# Patient Record
Sex: FEMALE | Race: WHITE | NOT HISPANIC OR LATINO | Employment: OTHER | URBAN - METROPOLITAN AREA
[De-identification: names, ages, dates, MRNs, and addresses within clinical notes are randomized per-mention and may not be internally consistent; named-entity substitution may affect disease eponyms.]

---

## 2017-08-30 ENCOUNTER — TELEPHONE (OUTPATIENT)
Dept: OTOLARYNGOLOGY | Facility: CLINIC | Age: 57
End: 2017-08-30

## 2017-08-31 ENCOUNTER — OFFICE VISIT (OUTPATIENT)
Dept: OTOLARYNGOLOGY | Facility: CLINIC | Age: 57
End: 2017-08-31
Payer: COMMERCIAL

## 2017-08-31 VITALS — DIASTOLIC BLOOD PRESSURE: 73 MMHG | SYSTOLIC BLOOD PRESSURE: 128 MMHG | HEART RATE: 66 BPM | TEMPERATURE: 98 F

## 2017-08-31 DIAGNOSIS — H92.01 EAR DISCOMFORT, RIGHT: ICD-10-CM

## 2017-08-31 DIAGNOSIS — H61.21 IMPACTED CERUMEN, RIGHT EAR: Primary | ICD-10-CM

## 2017-08-31 PROCEDURE — 99999 PR PBB SHADOW E&M-EST. PATIENT-LVL III: CPT | Mod: PBBFAC,,, | Performed by: OTOLARYNGOLOGY

## 2017-08-31 PROCEDURE — 99499 UNLISTED E&M SERVICE: CPT | Mod: S$GLB,,, | Performed by: OTOLARYNGOLOGY

## 2017-08-31 PROCEDURE — 69210 REMOVE IMPACTED EAR WAX UNI: CPT | Mod: S$GLB,,, | Performed by: OTOLARYNGOLOGY

## 2017-08-31 NOTE — PROGRESS NOTES
CC:Ear Cleaning  HPI:Ms. Potts is a 57 year old CF who indicates the need for an ear cleaning procedure.  She can tell when her ears become clogged, especially her right ear  I removed the cerumen impaction from her right ear in late July 2016.  I made note of a hyperpigmented skin lesion as a.  Left pinna at that time suggested dermatology evaluation.  The patient's medical problem list includes palpitations and a heart murmur.      ALLERGIES: Benadryl, shellfish  PE:/73 P 66 T 97.9 height 5 feet 4 inches weight 134 pounds  Gen.:  Alert and oriented lady in no acute distress  Both ears were examined under the microscope in the micro-procedure room.  A dry cerumen impaction with some dry skin is is extracted from the occluded right ear canal with microforceps.The right eardrum is intact and clear.  Several tiny superficial sebaceous cysts are noted to the left of the entrance of the right ear canal.  They are not inflamed.  Left ear canal is devoid of wax.  Left eardrum is intact and clear as visualized.      DIAGNOSIS:     ICD-10-CM ICD-9-CM    1. Impacted cerumen, right ear H61.21 380.4    2. Ear discomfort, right H92.01 388.70      PLAN: Dry skin/cerumen impaction removed from AD eac  AS eac devoid of wax  RTC prn

## 2022-11-22 ENCOUNTER — TELEPHONE (OUTPATIENT)
Dept: OTOLARYNGOLOGY | Facility: CLINIC | Age: 62
End: 2022-11-22
Payer: COMMERCIAL

## 2022-12-01 ENCOUNTER — OFFICE VISIT (OUTPATIENT)
Dept: OTOLARYNGOLOGY | Facility: CLINIC | Age: 62
End: 2022-12-01
Payer: COMMERCIAL

## 2022-12-01 VITALS — HEART RATE: 74 BPM | DIASTOLIC BLOOD PRESSURE: 84 MMHG | SYSTOLIC BLOOD PRESSURE: 146 MMHG

## 2022-12-01 DIAGNOSIS — H61.891 DRY EAR CANAL, RIGHT: ICD-10-CM

## 2022-12-01 DIAGNOSIS — Z78.9 HISTORY OF EXCESSIVE CERUMEN: Primary | ICD-10-CM

## 2022-12-01 DIAGNOSIS — H61.21 HEARING LOSS DUE TO CERUMEN IMPACTION, RIGHT: ICD-10-CM

## 2022-12-01 PROCEDURE — 3079F PR MOST RECENT DIASTOLIC BLOOD PRESSURE 80-89 MM HG: ICD-10-PCS | Mod: CPTII,S$GLB,, | Performed by: OTOLARYNGOLOGY

## 2022-12-01 PROCEDURE — 1159F MED LIST DOCD IN RCRD: CPT | Mod: CPTII,S$GLB,, | Performed by: OTOLARYNGOLOGY

## 2022-12-01 PROCEDURE — 99499 UNLISTED E&M SERVICE: CPT | Mod: S$GLB,,, | Performed by: OTOLARYNGOLOGY

## 2022-12-01 PROCEDURE — 3077F SYST BP >= 140 MM HG: CPT | Mod: CPTII,S$GLB,, | Performed by: OTOLARYNGOLOGY

## 2022-12-01 PROCEDURE — 99999 PR PBB SHADOW E&M-EST. PATIENT-LVL III: CPT | Mod: PBBFAC,,, | Performed by: OTOLARYNGOLOGY

## 2022-12-01 PROCEDURE — 1159F PR MEDICATION LIST DOCUMENTED IN MEDICAL RECORD: ICD-10-PCS | Mod: CPTII,S$GLB,, | Performed by: OTOLARYNGOLOGY

## 2022-12-01 PROCEDURE — 3077F PR MOST RECENT SYSTOLIC BLOOD PRESSURE >= 140 MM HG: ICD-10-PCS | Mod: CPTII,S$GLB,, | Performed by: OTOLARYNGOLOGY

## 2022-12-01 PROCEDURE — 99999 PR PBB SHADOW E&M-EST. PATIENT-LVL III: ICD-10-PCS | Mod: PBBFAC,,, | Performed by: OTOLARYNGOLOGY

## 2022-12-01 PROCEDURE — 99499 NO LOS: ICD-10-PCS | Mod: S$GLB,,, | Performed by: OTOLARYNGOLOGY

## 2022-12-01 PROCEDURE — 3079F DIAST BP 80-89 MM HG: CPT | Mod: CPTII,S$GLB,, | Performed by: OTOLARYNGOLOGY

## 2022-12-01 NOTE — PROGRESS NOTES
CC:  HPI: Ms. Tsai is a 62 year old CF with a history of palpitations/heart murmur who resides in [facility]. 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  She presents today for extraction of a cerumen impaction affecting her right ear canal specifically. There is a plugged sensation in her right ear.  I  disimpacted her right ear ( C.I. extraction) here in late August 2017. I had noted several small non-obstructive sebaceous cysts at the posterior rim of her right ear canal.  She has had her ears cleaned elsewhere in the meantime. She is reticent to place anything in her ear canals in general.  She has infrequent trouble with her ears on airplanes.      No past medical history on file.  ALL:  Benadryl, shellfish  Answers submitted by the patient for this visit:  Review of Symptoms Questionnaire  (Submitted on 11/29/2022)  None of these: Yes  None of these: Yes  None of these : Yes  None of these: Yes  Irregular heartbeat?: Yes  None of these: Yes  None of these: Yes  None of these: Yes  None of these : Yes  Seasonal Allergies?: Yes  None of these : Yes  None of these: Yes  None of these: Yes  None of these: Yes    ALL: benadryl/shellfish  PE:Gen.: alert and oriented bespectacled CF in no acute distress.  Both ears are examined under the microscope.  Procedures: A large dry plug of cerumen ( with a layer of dry skin) is extracted from the occluded dry AD eac with a blunt curette and microforceps.  A very small amount of cerumen is removed from the non-occluded AS eac.  Both TMs are clear and intact as visualized.  Again, I make note of several small sebaceous cysts noted at the entrance of the right ear canal postero-superiorly without obvious infection or abscess.    DIAGNOSIS:     ICD-10-CM ICD-9-CM    1. History of excessive cerumen  Z78.9 V49.89       2. Hearing loss due to cerumen impaction, right  H61.21 389.8      380.4       3. Dry ear canal, right  H61.891 380.89         PLAN: Dry C.I.  ( with some dry skin) extracted from dry ear canal   Small amount of cerumen removed from left ear canal with curette  RTC yearly for ear cleaning

## 2022-12-01 NOTE — PATIENT INSTRUCTIONS
Dry C.I. ( with some dry skin) extracted from dry ear canal   Small amount of cerumen removed from left ear canal with curette  RTC yearly for ear cleaning

## 2024-08-04 ENCOUNTER — HOSPITAL ENCOUNTER (OUTPATIENT)
Facility: HOSPITAL | Age: 64
Discharge: HOME | End: 2024-08-04
Attending: EMERGENCY MEDICINE | Admitting: INTERNAL MEDICINE
Payer: COMMERCIAL

## 2024-08-04 VITALS
BODY MASS INDEX: 22.36 KG/M2 | DIASTOLIC BLOOD PRESSURE: 49 MMHG | SYSTOLIC BLOOD PRESSURE: 112 MMHG | OXYGEN SATURATION: 97 % | TEMPERATURE: 98 F | HEART RATE: 87 BPM | HEIGHT: 64 IN | WEIGHT: 131 LBS | RESPIRATION RATE: 18 BRPM

## 2024-08-04 DIAGNOSIS — I48.91 NEW ONSET A-FIB: Primary | ICD-10-CM

## 2024-08-04 DIAGNOSIS — I48.91 NEW ONSET A-FIB: ICD-10-CM

## 2024-08-04 DIAGNOSIS — I48.91 ATRIAL FIBRILLATION WITH RVR: Primary | ICD-10-CM

## 2024-08-04 DIAGNOSIS — R00.2 PALPITATIONS: ICD-10-CM

## 2024-08-04 DIAGNOSIS — R07.9 CHEST PAIN: ICD-10-CM

## 2024-08-04 PROBLEM — Z78.9 ALCOHOL USE: Status: ACTIVE | Noted: 2024-08-04

## 2024-08-04 PROBLEM — E87.6 HYPOKALEMIA: Status: ACTIVE | Noted: 2024-08-04

## 2024-08-04 LAB
ALBUMIN SERPL BCP-MCNC: 4 G/DL (ref 3.5–5.2)
ALP SERPL-CCNC: 73 U/L (ref 55–135)
ALT SERPL W/O P-5'-P-CCNC: 13 U/L (ref 10–44)
AMPHET+METHAMPHET UR QL: NEGATIVE
ANION GAP SERPL CALC-SCNC: 11 MMOL/L (ref 8–16)
ASCENDING AORTA: 3.09 CM
AST SERPL-CCNC: 17 U/L (ref 10–40)
AV INDEX (PROSTH): 0.57
AV MEAN GRADIENT: 3 MMHG
AV PEAK GRADIENT: 5 MMHG
AV VALVE AREA BY VELOCITY RATIO: 1.98 CM²
AV VALVE AREA: 1.74 CM²
AV VELOCITY RATIO: 0.64
BARBITURATES UR QL SCN>200 NG/ML: NEGATIVE
BASOPHILS # BLD AUTO: 0.03 K/UL (ref 0–0.2)
BASOPHILS NFR BLD: 0.4 % (ref 0–1.9)
BENZODIAZ UR QL SCN>200 NG/ML: NEGATIVE
BILIRUB SERPL-MCNC: 0.3 MG/DL (ref 0.1–1)
BNP SERPL-MCNC: 38 PG/ML (ref 0–99)
BSA FOR ECHO PROCEDURE: 1.64 M2
BUN SERPL-MCNC: 17 MG/DL (ref 8–23)
BZE UR QL SCN: NEGATIVE
CALCIUM SERPL-MCNC: 9.6 MG/DL (ref 8.7–10.5)
CANNABINOIDS UR QL SCN: NEGATIVE
CHLORIDE SERPL-SCNC: 107 MMOL/L (ref 95–110)
CO2 SERPL-SCNC: 24 MMOL/L (ref 23–29)
CREAT SERPL-MCNC: 0.9 MG/DL (ref 0.5–1.4)
CREAT UR-MCNC: 22 MG/DL (ref 15–325)
CV ECHO LV RWT: 0.38 CM
D DIMER PPP IA.FEU-MCNC: 0.63 MG/L FEU
DIFFERENTIAL METHOD BLD: NORMAL
DOP CALC AO PEAK VEL: 1.15 M/S
DOP CALC AO VTI: 18.72 CM
DOP CALC LVOT AREA: 3.1 CM2
DOP CALC LVOT DIAMETER: 1.98 CM
DOP CALC LVOT PEAK VEL: 0.74 M/S
DOP CALC LVOT STROKE VOLUME: 32.65 CM3
DOP CALCLVOT PEAK VEL VTI: 10.61 CM
E/E' RATIO: 13.5 M/S
ECHO LV POSTERIOR WALL: 0.74 CM (ref 0.6–1.1)
EOSINOPHIL # BLD AUTO: 0.2 K/UL (ref 0–0.5)
EOSINOPHIL NFR BLD: 2.8 % (ref 0–8)
ERYTHROCYTE [DISTWIDTH] IN BLOOD BY AUTOMATED COUNT: 13.7 % (ref 11.5–14.5)
EST. GFR  (NO RACE VARIABLE): >60 ML/MIN/1.73 M^2
ETHANOL UR-MCNC: <10 MG/DL
FRACTIONAL SHORTENING: 34 % (ref 28–44)
GLUCOSE SERPL-MCNC: 104 MG/DL (ref 70–110)
HCT VFR BLD AUTO: 42.1 % (ref 37–48.5)
HCV AB SERPL QL IA: NORMAL
HGB BLD-MCNC: 13.7 G/DL (ref 12–16)
HIV 1+2 AB+HIV1 P24 AG SERPL QL IA: NORMAL
IMM GRANULOCYTES # BLD AUTO: 0.02 K/UL (ref 0–0.04)
IMM GRANULOCYTES NFR BLD AUTO: 0.3 % (ref 0–0.5)
INTERVENTRICULAR SEPTUM: 0.66 CM (ref 0.6–1.1)
IVRT: 89.44 MSEC
LA MAJOR: 5.85 CM
LA MINOR: 4.91 CM
LA WIDTH: 3.9 CM
LEFT ATRIUM SIZE: 3.61 CM
LEFT ATRIUM VOLUME INDEX MOD: 29.4 ML/M2
LEFT ATRIUM VOLUME INDEX: 39.2 ML/M2
LEFT ATRIUM VOLUME MOD: 47.94 CM3
LEFT ATRIUM VOLUME: 63.89 CM3
LEFT INTERNAL DIMENSION IN SYSTOLE: 2.57 CM (ref 2.1–4)
LEFT VENTRICLE DIASTOLIC VOLUME INDEX: 40.04 ML/M2
LEFT VENTRICLE DIASTOLIC VOLUME: 65.26 ML
LEFT VENTRICLE MASS INDEX: 46 G/M2
LEFT VENTRICLE SYSTOLIC VOLUME INDEX: 14.6 ML/M2
LEFT VENTRICLE SYSTOLIC VOLUME: 23.85 ML
LEFT VENTRICULAR INTERNAL DIMENSION IN DIASTOLE: 3.88 CM (ref 3.5–6)
LEFT VENTRICULAR MASS: 74.47 G
LV LATERAL E/E' RATIO: 16.2 M/S
LV SEPTAL E/E' RATIO: 11.57 M/S
LYMPHOCYTES # BLD AUTO: 3.1 K/UL (ref 1–4.8)
LYMPHOCYTES NFR BLD: 41.2 % (ref 18–48)
MAGNESIUM SERPL-MCNC: 2.2 MG/DL (ref 1.6–2.6)
MCH RBC QN AUTO: 29.3 PG (ref 27–31)
MCHC RBC AUTO-ENTMCNC: 32.5 G/DL (ref 32–36)
MCV RBC AUTO: 90 FL (ref 82–98)
METHADONE UR QL SCN>300 NG/ML: NEGATIVE
MONOCYTES # BLD AUTO: 0.8 K/UL (ref 0.3–1)
MONOCYTES NFR BLD: 10.9 % (ref 4–15)
MV PEAK E VEL: 0.81 M/S
NEUTROPHILS # BLD AUTO: 3.3 K/UL (ref 1.8–7.7)
NEUTROPHILS NFR BLD: 44.4 % (ref 38–73)
NRBC BLD-RTO: 0 /100 WBC
OHS QRS DURATION: 72 MS
OHS QRS DURATION: 76 MS
OHS QTC CALCULATION: 463 MS
OHS QTC CALCULATION: 482 MS
OPIATES UR QL SCN: NEGATIVE
PCP UR QL SCN>25 NG/ML: NEGATIVE
PISA TR MAX VEL: 2.71 M/S
PLATELET # BLD AUTO: 253 K/UL (ref 150–450)
PMV BLD AUTO: 10.3 FL (ref 9.2–12.9)
POTASSIUM SERPL-SCNC: 3.3 MMOL/L (ref 3.5–5.1)
PROT SERPL-MCNC: 7.1 G/DL (ref 6–8.4)
RA MAJOR: 5.24 CM
RA PRESSURE ESTIMATED: 8 MMHG
RA WIDTH: 3.21 CM
RBC # BLD AUTO: 4.68 M/UL (ref 4–5.4)
RV TB RVSP: 11 MMHG
SINUS: 2.9 CM
SODIUM SERPL-SCNC: 142 MMOL/L (ref 136–145)
STJ: 2.42 CM
TDI LATERAL: 0.05 M/S
TDI SEPTAL: 0.07 M/S
TDI: 0.06 M/S
TOXICOLOGY INFORMATION: NORMAL
TR MAX PG: 29 MMHG
TRICUSPID ANNULAR PLANE SYSTOLIC EXCURSION: 1.99 CM
TROPONIN I SERPL DL<=0.01 NG/ML-MCNC: <0.006 NG/ML (ref 0–0.03)
TSH SERPL DL<=0.005 MIU/L-ACNC: 3.21 UIU/ML (ref 0.4–4)
TV REST PULMONARY ARTERY PRESSURE: 37 MMHG
WBC # BLD AUTO: 7.5 K/UL (ref 3.9–12.7)
Z-SCORE OF LEFT VENTRICULAR DIMENSION IN END DIASTOLE: -1.7
Z-SCORE OF LEFT VENTRICULAR DIMENSION IN END SYSTOLE: -0.82

## 2024-08-04 PROCEDURE — G0378 HOSPITAL OBSERVATION PER HR: HCPCS

## 2024-08-04 PROCEDURE — 85379 FIBRIN DEGRADATION QUANT: CPT | Performed by: EMERGENCY MEDICINE

## 2024-08-04 PROCEDURE — 84443 ASSAY THYROID STIM HORMONE: CPT | Performed by: EMERGENCY MEDICINE

## 2024-08-04 PROCEDURE — 25000003 PHARM REV CODE 250: Performed by: INTERNAL MEDICINE

## 2024-08-04 PROCEDURE — 25000003 PHARM REV CODE 250

## 2024-08-04 PROCEDURE — 96374 THER/PROPH/DIAG INJ IV PUSH: CPT | Mod: 59

## 2024-08-04 PROCEDURE — 83880 ASSAY OF NATRIURETIC PEPTIDE: CPT | Performed by: EMERGENCY MEDICINE

## 2024-08-04 PROCEDURE — 96376 TX/PRO/DX INJ SAME DRUG ADON: CPT

## 2024-08-04 PROCEDURE — 25000003 PHARM REV CODE 250: Performed by: HOSPITALIST

## 2024-08-04 PROCEDURE — 80307 DRUG TEST PRSMV CHEM ANLYZR: CPT | Performed by: HOSPITALIST

## 2024-08-04 PROCEDURE — 93005 ELECTROCARDIOGRAM TRACING: CPT

## 2024-08-04 PROCEDURE — 87389 HIV-1 AG W/HIV-1&-2 AB AG IA: CPT | Performed by: PHYSICIAN ASSISTANT

## 2024-08-04 PROCEDURE — 85025 COMPLETE CBC W/AUTO DIFF WBC: CPT | Performed by: EMERGENCY MEDICINE

## 2024-08-04 PROCEDURE — 96361 HYDRATE IV INFUSION ADD-ON: CPT

## 2024-08-04 PROCEDURE — 80053 COMPREHEN METABOLIC PANEL: CPT | Performed by: EMERGENCY MEDICINE

## 2024-08-04 PROCEDURE — 99291 CRITICAL CARE FIRST HOUR: CPT

## 2024-08-04 PROCEDURE — 86803 HEPATITIS C AB TEST: CPT | Performed by: PHYSICIAN ASSISTANT

## 2024-08-04 PROCEDURE — 83735 ASSAY OF MAGNESIUM: CPT | Performed by: EMERGENCY MEDICINE

## 2024-08-04 PROCEDURE — 25000003 PHARM REV CODE 250: Performed by: EMERGENCY MEDICINE

## 2024-08-04 PROCEDURE — 84484 ASSAY OF TROPONIN QUANT: CPT | Performed by: EMERGENCY MEDICINE

## 2024-08-04 RX ORDER — TALC
6 POWDER (GRAM) TOPICAL NIGHTLY PRN
Status: DISCONTINUED | OUTPATIENT
Start: 2024-08-04 | End: 2024-08-04 | Stop reason: HOSPADM

## 2024-08-04 RX ORDER — METOPROLOL SUCCINATE 100 MG/1
100 TABLET, EXTENDED RELEASE ORAL DAILY
Qty: 30 TABLET | Refills: 0 | Status: SHIPPED | OUTPATIENT
Start: 2024-08-04 | End: 2024-09-03

## 2024-08-04 RX ORDER — METOPROLOL TARTRATE 25 MG/1
25 TABLET, FILM COATED ORAL 2 TIMES DAILY
Status: DISCONTINUED | OUTPATIENT
Start: 2024-08-04 | End: 2024-08-04

## 2024-08-04 RX ORDER — IBUPROFEN 200 MG
16 TABLET ORAL
Status: DISCONTINUED | OUTPATIENT
Start: 2024-08-04 | End: 2024-08-04 | Stop reason: HOSPADM

## 2024-08-04 RX ORDER — IBUPROFEN 200 MG
24 TABLET ORAL
Status: DISCONTINUED | OUTPATIENT
Start: 2024-08-04 | End: 2024-08-04 | Stop reason: HOSPADM

## 2024-08-04 RX ORDER — METOPROLOL TARTRATE 25 MG/1
25 TABLET, FILM COATED ORAL EVERY 6 HOURS
Status: DISCONTINUED | OUTPATIENT
Start: 2024-08-04 | End: 2024-08-04 | Stop reason: HOSPADM

## 2024-08-04 RX ORDER — METOPROLOL TARTRATE 1 MG/ML
5 INJECTION, SOLUTION INTRAVENOUS
Status: COMPLETED | OUTPATIENT
Start: 2024-08-04 | End: 2024-08-04

## 2024-08-04 RX ORDER — ACETAMINOPHEN 325 MG/1
650 TABLET ORAL EVERY 4 HOURS PRN
Status: DISCONTINUED | OUTPATIENT
Start: 2024-08-04 | End: 2024-08-04 | Stop reason: HOSPADM

## 2024-08-04 RX ORDER — GLUCAGON 1 MG
1 KIT INJECTION
Status: DISCONTINUED | OUTPATIENT
Start: 2024-08-04 | End: 2024-08-04 | Stop reason: HOSPADM

## 2024-08-04 RX ORDER — POTASSIUM CHLORIDE 20 MEQ/1
40 TABLET, EXTENDED RELEASE ORAL ONCE
Status: COMPLETED | OUTPATIENT
Start: 2024-08-04 | End: 2024-08-04

## 2024-08-04 RX ORDER — NALOXONE HCL 0.4 MG/ML
0.02 VIAL (ML) INJECTION
Status: DISCONTINUED | OUTPATIENT
Start: 2024-08-04 | End: 2024-08-04 | Stop reason: HOSPADM

## 2024-08-04 RX ORDER — ALUMINUM HYDROXIDE, MAGNESIUM HYDROXIDE, AND SIMETHICONE 1200; 120; 1200 MG/30ML; MG/30ML; MG/30ML
30 SUSPENSION ORAL 4 TIMES DAILY PRN
Status: DISCONTINUED | OUTPATIENT
Start: 2024-08-04 | End: 2024-08-04 | Stop reason: HOSPADM

## 2024-08-04 RX ORDER — SODIUM CHLORIDE 0.9 % (FLUSH) 0.9 %
10 SYRINGE (ML) INJECTION EVERY 12 HOURS PRN
Status: DISCONTINUED | OUTPATIENT
Start: 2024-08-04 | End: 2024-08-04 | Stop reason: HOSPADM

## 2024-08-04 RX ORDER — ONDANSETRON HYDROCHLORIDE 2 MG/ML
4 INJECTION, SOLUTION INTRAVENOUS EVERY 8 HOURS PRN
Status: DISCONTINUED | OUTPATIENT
Start: 2024-08-04 | End: 2024-08-04 | Stop reason: HOSPADM

## 2024-08-04 RX ADMIN — SODIUM CHLORIDE 1000 ML: 9 INJECTION, SOLUTION INTRAVENOUS at 04:08

## 2024-08-04 RX ADMIN — METOPROLOL TARTRATE 25 MG: 25 TABLET, FILM COATED ORAL at 05:08

## 2024-08-04 RX ADMIN — METOROPROLOL TARTRATE 5 MG: 5 INJECTION, SOLUTION INTRAVENOUS at 03:08

## 2024-08-04 RX ADMIN — METOROPROLOL TARTRATE 5 MG: 5 INJECTION, SOLUTION INTRAVENOUS at 04:08

## 2024-08-04 RX ADMIN — APIXABAN 5 MG: 5 TABLET, FILM COATED ORAL at 03:08

## 2024-08-04 RX ADMIN — POTASSIUM CHLORIDE 40 MEQ: 1500 TABLET, EXTENDED RELEASE ORAL at 05:08

## 2024-08-04 RX ADMIN — METOPROLOL TARTRATE 25 MG: 25 TABLET, FILM COATED ORAL at 04:08

## 2024-08-05 DIAGNOSIS — R00.2 PALPITATIONS: Primary | ICD-10-CM

## 2024-08-06 ENCOUNTER — OFFICE VISIT (OUTPATIENT)
Dept: ELECTROPHYSIOLOGY | Facility: CLINIC | Age: 64
End: 2024-08-06
Payer: COMMERCIAL

## 2024-08-06 VITALS — HEART RATE: 58 BPM | BODY MASS INDEX: 22.02 KG/M2 | WEIGHT: 129 LBS | HEIGHT: 64 IN

## 2024-08-06 DIAGNOSIS — R00.2 PALPITATIONS: ICD-10-CM

## 2024-08-06 DIAGNOSIS — I48.91 NEW ONSET A-FIB: Primary | ICD-10-CM

## 2024-08-06 DIAGNOSIS — I48.91 ATRIAL FIBRILLATION WITH RVR: ICD-10-CM

## 2024-08-06 LAB
OHS QRS DURATION: 58 MS
OHS QTC CALCULATION: 382 MS

## 2024-08-06 PROCEDURE — 99204 OFFICE O/P NEW MOD 45 MIN: CPT | Mod: S$GLB,,, | Performed by: INTERNAL MEDICINE

## 2024-08-06 PROCEDURE — 93010 ELECTROCARDIOGRAM REPORT: CPT | Mod: S$GLB,,, | Performed by: INTERNAL MEDICINE

## 2024-08-06 PROCEDURE — 93005 ELECTROCARDIOGRAM TRACING: CPT | Mod: S$GLB,,, | Performed by: INTERNAL MEDICINE

## 2024-08-06 PROCEDURE — 1159F MED LIST DOCD IN RCRD: CPT | Mod: CPTII,S$GLB,, | Performed by: INTERNAL MEDICINE

## 2024-08-06 PROCEDURE — 3008F BODY MASS INDEX DOCD: CPT | Mod: CPTII,S$GLB,, | Performed by: INTERNAL MEDICINE

## 2024-08-06 PROCEDURE — 1101F PT FALLS ASSESS-DOCD LE1/YR: CPT | Mod: CPTII,S$GLB,, | Performed by: INTERNAL MEDICINE

## 2024-08-06 PROCEDURE — 1126F AMNT PAIN NOTED NONE PRSNT: CPT | Mod: CPTII,S$GLB,, | Performed by: INTERNAL MEDICINE

## 2024-08-06 PROCEDURE — 3288F FALL RISK ASSESSMENT DOCD: CPT | Mod: CPTII,S$GLB,, | Performed by: INTERNAL MEDICINE

## 2024-08-06 PROCEDURE — 99999 PR PBB SHADOW E&M-EST. PATIENT-LVL III: CPT | Mod: PBBFAC,,, | Performed by: INTERNAL MEDICINE

## 2024-08-10 ENCOUNTER — PATIENT MESSAGE (OUTPATIENT)
Dept: ADMINISTRATIVE | Facility: OTHER | Age: 64
End: 2024-08-10
Payer: COMMERCIAL

## 2024-08-14 ENCOUNTER — PATIENT MESSAGE (OUTPATIENT)
Dept: ELECTROPHYSIOLOGY | Facility: CLINIC | Age: 64
End: 2024-08-14
Payer: COMMERCIAL

## 2024-08-15 ENCOUNTER — CLINICAL SUPPORT (OUTPATIENT)
Dept: CARDIOLOGY | Facility: HOSPITAL | Age: 64
End: 2024-08-15
Attending: INTERNAL MEDICINE
Payer: COMMERCIAL

## 2024-08-15 DIAGNOSIS — I48.91 NEW ONSET A-FIB: ICD-10-CM

## 2024-08-15 DIAGNOSIS — I48.91 ATRIAL FIBRILLATION WITH RVR: ICD-10-CM

## 2024-08-15 DIAGNOSIS — R00.2 PALPITATIONS: ICD-10-CM

## 2024-08-15 PROCEDURE — 93246 EXT ECG>7D<15D RECORDING: CPT

## 2024-08-20 RX ORDER — METOPROLOL SUCCINATE 100 MG/1
100 TABLET, EXTENDED RELEASE ORAL DAILY
Qty: 30 TABLET | Refills: 11 | Status: SHIPPED | OUTPATIENT
Start: 2024-08-20 | End: 2025-08-15

## 2024-08-26 ENCOUNTER — TELEPHONE (OUTPATIENT)
Dept: ELECTROPHYSIOLOGY | Facility: CLINIC | Age: 64
End: 2024-08-26
Payer: COMMERCIAL

## 2024-08-26 NOTE — TELEPHONE ENCOUNTER
Patient sent note complaining of extreme fatigue. Asked if she can decrease her dose of metoprolol. Per Dr. Gomez, patient can decrease metoprolol to 50mg daily. Patient can cut her pills in half. Patient verbalized understanding.

## 2024-09-16 ENCOUNTER — PATIENT MESSAGE (OUTPATIENT)
Dept: ELECTROPHYSIOLOGY | Facility: CLINIC | Age: 64
End: 2024-09-16
Payer: COMMERCIAL

## 2024-09-17 NOTE — TELEPHONE ENCOUNTER
Called patient. I went over the monitor results with her and let her know that it did not show any afib. I also told her that if her HR is low and she feels tired she could go down to 25mg of Metoprolol. (She is currently on 50mg PO Qday). Patient states she feels fine and her HR is fine. I She asked about needing any procedures done. I told her there is no afib at this time. If she should go into afib to let us know and we will get her in to discuss medication vs. Procedure. Patient verbalized understanding.